# Patient Record
Sex: FEMALE | Race: WHITE | Employment: UNEMPLOYED | URBAN - METROPOLITAN AREA
[De-identification: names, ages, dates, MRNs, and addresses within clinical notes are randomized per-mention and may not be internally consistent; named-entity substitution may affect disease eponyms.]

---

## 2017-02-25 PROBLEM — D68.52 PROTHROMBIN GENE MUTATION (CODE): Status: ACTIVE | Noted: 2017-02-25

## 2017-12-21 ENCOUNTER — HOSPITAL ENCOUNTER (OUTPATIENT)
Dept: VASCULAR LAB | Age: 47
Discharge: HOME | End: 2017-12-21
Attending: INTERNAL MEDICINE | Admitting: INTERNAL MEDICINE

## 2017-12-21 DIAGNOSIS — I82.409 RECURRENT DEEP VEIN THROMBOSIS (HCC): Primary | ICD-10-CM

## 2017-12-21 DIAGNOSIS — Z86.711 PERSONAL HISTORY OF PULMONARY EMBOLISM: ICD-10-CM

## 2018-01-12 ENCOUNTER — OFFICE VISIT (OUTPATIENT)
Dept: INTERNAL MEDICINE CLINIC | Age: 48
End: 2018-01-12

## 2018-01-12 VITALS
OXYGEN SATURATION: 99 % | TEMPERATURE: 98 F | WEIGHT: 160 LBS | SYSTOLIC BLOOD PRESSURE: 110 MMHG | HEIGHT: 66 IN | HEART RATE: 89 BPM | DIASTOLIC BLOOD PRESSURE: 70 MMHG | BODY MASS INDEX: 25.71 KG/M2

## 2018-01-12 DIAGNOSIS — M79.605 PAIN IN BOTH LOWER EXTREMITIES: Primary | ICD-10-CM

## 2018-01-12 DIAGNOSIS — M79.604 PAIN IN BOTH LOWER EXTREMITIES: Primary | ICD-10-CM

## 2018-01-12 DIAGNOSIS — R13.10 DYSPHAGIA, UNSPECIFIED TYPE: ICD-10-CM

## 2018-01-12 DIAGNOSIS — Z86.718 HISTORY OF DVT (DEEP VEIN THROMBOSIS): ICD-10-CM

## 2018-01-12 PROCEDURE — 99213 OFFICE O/P EST LOW 20 MIN: CPT | Performed by: NURSE PRACTITIONER

## 2018-01-12 ASSESSMENT — PATIENT HEALTH QUESTIONNAIRE - PHQ9
SUM OF ALL RESPONSES TO PHQ9 QUESTIONS 1 & 2: 0
1. LITTLE INTEREST OR PLEASURE IN DOING THINGS: 0
SUM OF ALL RESPONSES TO PHQ QUESTIONS 1-9: 0
2. FEELING DOWN, DEPRESSED OR HOPELESS: 0

## 2018-01-12 ASSESSMENT — ENCOUNTER SYMPTOMS: TROUBLE SWALLOWING: 1

## 2018-02-02 ENCOUNTER — OFFICE VISIT (OUTPATIENT)
Dept: VASCULAR SURGERY | Age: 48
End: 2018-02-02

## 2018-02-02 VITALS
BODY MASS INDEX: 25.88 KG/M2 | HEIGHT: 66 IN | SYSTOLIC BLOOD PRESSURE: 118 MMHG | WEIGHT: 161 LBS | DIASTOLIC BLOOD PRESSURE: 68 MMHG

## 2018-02-02 DIAGNOSIS — Z86.718 HISTORY OF DVT (DEEP VEIN THROMBOSIS): Primary | ICD-10-CM

## 2018-02-02 PROCEDURE — 99203 OFFICE O/P NEW LOW 30 MIN: CPT | Performed by: SURGERY

## 2018-02-02 NOTE — PROGRESS NOTES
[facility])  Consultation/History & Physical    Date of Admission:  (Not on file)  Date of Consultation:  2/2/2018    PCP:  Josette Mcgrath NP     Chief Complaint:    Chief Complaint   Patient presents with    New Patient           History of Present Illness: Phu Watkins is a 41 y.o. female who presents with History of prothrombin gene mutation pulmonary embolism and lower extremity DVT. She was on anticoagulation, which was stopped secondary to heavy menstrual periods. She occasionally complains of discomfort in her right leg. She does have a knee-high support compression stockings. She is unclear what her family history is related to her clotting disorders. Currently, she is doing very well. Denies any aching, heaviness, or fatigue Presently. Denies any chest pain or shortness of breath. PMH:   has a past medical history of DVT (deep venous thrombosis) ([facility] [address] Endometriosis; Low back pain; PE (pulmonary embolism); and Prothrombin gene mutation ([facility] [address] PSH:   has a past surgical history that includes ovarian cyst removal (2005). Allergies:  No Known Allergies     Home Meds:    Prior to Admission medications    Medication Sig Start Date End Date Taking? Authorizing Provider   aspirin 81 MG tablet Take 81 mg by mouth daily   Yes Historical Provider, MD   Ascorbic Acid (VITAMIN C) 500 MG tablet Take 500 mg by mouth daily   Yes Historical Provider, MD        Hospital Meds:    Current Outpatient Prescriptions   Medication Sig Dispense Refill    aspirin 81 MG tablet Take 81 mg by mouth daily      Ascorbic Acid (VITAMIN C) 500 MG tablet Take 500 mg by mouth daily       No current facility-administered medications for this visit.         Social History:       Social History     Social History    Marital status:      Spouse name: N/A    Number of children: N/A    Years of education: N/A     Social History Main Topics    Smoking status: Never Smoker    Smokeless tobacco: Never Used    Alcohol  02/06/2013    K 4.5 02/06/2013     02/06/2013    CO2 25 09/07/2015    BUN 14 02/06/2013    CREATININE 0.6 09/07/2015    CREATININE 0.8 02/06/2013      No components found for: GLUNo results found for: MG   CBC:   Lab Results   Component Value Date    WBC 8.8 09/07/2015    HGB 13.3 09/07/2015    HCT 39.4 09/07/2015    MCV 95.8 09/07/2015     09/07/2015      Coagulation:   Lab Results   Component Value Date    INR 0.99 09/07/2015    APTT 28.6 09/07/2015     Cardiac markers:   Lab Results   Component Value Date    TROPONINI 0.00 09/07/2015     No results found for: LABA1C No results found for: ALB    Lab Results   Component Value Date    BILITOT 0.40 02/06/2013    AST 18 02/06/2013    ALT 14 02/06/2013    ALKPHOS 53 02/06/2013      Lab Results   Component Value Date    CHOL 190 02/06/2013    HDL 85 02/06/2013    LDLCALC 88 02/06/2013    TRIG 87 02/06/2013          Diagnosis:  Patient Active Problem List   Diagnosis    Back pain    Endometriosis    PE (pulmonary thromboembolism) ([address]    DVT (deep venous thrombosis) (HCC)    Acute pulmonary embolism (HCC)    Prothrombin gene mutation ([address]    Pulmonary embolus ([address]    Acute embolism and thrombosis of vein    Recurrent deep vein thrombosis (HCC)    Prothrombin gene mutation (CODE)    Dysphagia    History of DVT (deep vein thrombosis)    Pain in both lower extremities         Assessment:     No diagnosis found. Recommendations/Plan:  30-year-old female with prothrombin gene mutation and isolated DVT of the right leg in 2015. She does have residual chronic clot which is expected and I explained this to her in detail today. I would recommend thigh-high 20-30 mm support stockings. She will wear this for several weeks and follow-up with me. I had a long discussion with her, related to her anticoagulation. Given her heavy menses. I agree with aspirin only.   I did instruct her to take a dose of Xarelto  if she were to take long

## 2019-02-13 ENCOUNTER — TELEPHONE (OUTPATIENT)
Dept: INTERNAL MEDICINE CLINIC | Age: 49
End: 2019-02-13

## 2019-07-14 ENCOUNTER — HOSPITAL ENCOUNTER (EMERGENCY)
Age: 49
Discharge: HOME | End: 2019-07-14
Attending: EMERGENCY MEDICINE
Payer: COMMERCIAL

## 2019-07-14 ENCOUNTER — APPOINTMENT (OUTPATIENT)
Dept: CT IMAGING | Age: 49
End: 2019-07-14
Payer: COMMERCIAL

## 2019-07-14 VITALS
SYSTOLIC BLOOD PRESSURE: 118 MMHG | RESPIRATION RATE: 14 BRPM | WEIGHT: 161 LBS | HEART RATE: 83 BPM | BODY MASS INDEX: 25.88 KG/M2 | HEIGHT: 66 IN | DIASTOLIC BLOOD PRESSURE: 77 MMHG | OXYGEN SATURATION: 99 % | TEMPERATURE: 98.5 F

## 2019-07-14 DIAGNOSIS — M79.661 RIGHT CALF PAIN: ICD-10-CM

## 2019-07-14 DIAGNOSIS — R07.9 CHEST PAIN, UNSPECIFIED TYPE: Primary | ICD-10-CM

## 2019-07-14 LAB
ANION GAP SERPL CALCULATED.3IONS-SCNC: 12 MMOL/L (ref 3–16)
BASOPHILS ABSOLUTE: 0 K/UL (ref 0–0.2)
BASOPHILS RELATIVE PERCENT: 0.5 %
BUN BLDV-MCNC: 12 MG/DL (ref 7–20)
CALCIUM SERPL-MCNC: 9.3 MG/DL (ref 8.3–10.6)
CHLORIDE BLD-SCNC: 104 MMOL/L (ref 99–110)
CO2: 26 MMOL/L (ref 21–32)
CREAT SERPL-MCNC: 0.6 MG/DL (ref 0.6–1.1)
EOSINOPHILS ABSOLUTE: 0 K/UL (ref 0–0.6)
EOSINOPHILS RELATIVE PERCENT: 0.8 %
GFR AFRICAN AMERICAN: >60
GFR NON-AFRICAN AMERICAN: >60
GLUCOSE BLD-MCNC: 136 MG/DL (ref 70–99)
HCT VFR BLD CALC: 39 % (ref 36–48)
HEMOGLOBIN: 13.3 G/DL (ref 12–16)
INR BLD: 1.08 (ref 0.86–1.14)
LYMPHOCYTES ABSOLUTE: 1.1 K/UL (ref 1–5.1)
LYMPHOCYTES RELATIVE PERCENT: 20.7 %
MCH RBC QN AUTO: 32.7 PG (ref 26–34)
MCHC RBC AUTO-ENTMCNC: 34.2 G/DL (ref 31–36)
MCV RBC AUTO: 95.7 FL (ref 80–100)
MONOCYTES ABSOLUTE: 0.4 K/UL (ref 0–1.3)
MONOCYTES RELATIVE PERCENT: 7.6 %
NEUTROPHILS ABSOLUTE: 3.6 K/UL (ref 1.7–7.7)
NEUTROPHILS RELATIVE PERCENT: 70.4 %
PDW BLD-RTO: 13.2 % (ref 12.4–15.4)
PLATELET # BLD: 213 K/UL (ref 135–450)
PMV BLD AUTO: 8.2 FL (ref 5–10.5)
POTASSIUM SERPL-SCNC: 4.6 MMOL/L (ref 3.5–5.1)
PROTHROMBIN TIME: 12.3 SEC (ref 9.8–13)
RBC # BLD: 4.07 M/UL (ref 4–5.2)
SODIUM BLD-SCNC: 142 MMOL/L (ref 136–145)
TROPONIN: <0.01 NG/ML
TROPONIN: <0.01 NG/ML
WBC # BLD: 5.1 K/UL (ref 4–11)

## 2019-07-14 PROCEDURE — 96372 THER/PROPH/DIAG INJ SC/IM: CPT

## 2019-07-14 PROCEDURE — 99285 EMERGENCY DEPT VISIT HI MDM: CPT

## 2019-07-14 PROCEDURE — 6360000002 HC RX W HCPCS: Performed by: NURSE PRACTITIONER

## 2019-07-14 PROCEDURE — 93005 ELECTROCARDIOGRAM TRACING: CPT | Performed by: EMERGENCY MEDICINE

## 2019-07-14 PROCEDURE — 80048 BASIC METABOLIC PNL TOTAL CA: CPT

## 2019-07-14 PROCEDURE — 85025 COMPLETE CBC W/AUTO DIFF WBC: CPT

## 2019-07-14 PROCEDURE — 85610 PROTHROMBIN TIME: CPT

## 2019-07-14 PROCEDURE — 71260 CT THORAX DX C+: CPT

## 2019-07-14 PROCEDURE — 84484 ASSAY OF TROPONIN QUANT: CPT

## 2019-07-14 PROCEDURE — 6360000004 HC RX CONTRAST MEDICATION: Performed by: EMERGENCY MEDICINE

## 2019-07-14 RX ORDER — ESOMEPRAZOLE MAGNESIUM 40 MG/1
20 FOR SUSPENSION ORAL DAILY
COMMUNITY

## 2019-07-14 RX ORDER — OMEGA-3S/DHA/EPA/FISH OIL 1000-1400
CAPSULE,DELAYED RELEASE (ENTERIC COATED) ORAL
COMMUNITY

## 2019-07-14 RX ORDER — BIOTIN 10000 MCG
CAPSULE ORAL
COMMUNITY

## 2019-07-14 RX ADMIN — ENOXAPARIN SODIUM 100 MG: 100 INJECTION SUBCUTANEOUS at 16:30

## 2019-07-14 RX ADMIN — IOPAMIDOL 80 ML: 755 INJECTION, SOLUTION INTRAVENOUS at 14:26

## 2019-07-14 ASSESSMENT — PAIN DESCRIPTION - PAIN TYPE: TYPE: ACUTE PAIN

## 2019-07-14 ASSESSMENT — PAIN DESCRIPTION - ORIENTATION: ORIENTATION: RIGHT

## 2019-07-14 ASSESSMENT — PAIN SCALES - GENERAL: PAINLEVEL_OUTOF10: 4

## 2019-07-14 ASSESSMENT — PAIN DESCRIPTION - LOCATION: LOCATION: KNEE

## 2019-07-14 NOTE — ED PROVIDER NOTES
ED Attending Attestation Note     Date of evaluation: 7/14/2019    This patient was seen by the advance practice provider. I have seen and examined the patient, agree with the workup, evaluation, management and diagnosis. The care plan has been discussed. I have reviewed the ECG and concur with the PATTI's interpretation. My assessment reveals 53-year-old female with a history of prothrombin gene mutation with a history of DVTs and PEs. Patient comes in for leg pain on the left side as well as some chest tightness. Patient has been off of her anticoagulation has been just been taking a daily aspirin. Patient did have recent travel. Patient also complained of some chest tightness that comes and goes and is not exertional.  EKG is nonischemic. Left lower extremity without any significant warmth or erythema but mild tenderness to palpation to the distal aspect of the posterior thigh. No palpable cord normal sensation normal pulses. Lung and heart sounds are clear. CTA of the chest obtained negative for PE. Labs sent including delta troponin that are unremarkable. Given unable to obtain duplex as it is a Sunday, patient given 1.5 mg/kg of Lovenox and given instructions on obtaining ultrasound tomorrow.     Sumit Sylvester MD   [facility]       Leia Alcaraz MD  07/14/19 [id number]
She is on Xarelto for about a year and a half and had transitioned to a baby aspirin. States she drove to [place] last week the back home. Patient is now concerned she may have another DVT or PE. She did take a Xarelto yesterday for her symptoms. On exam alert and oriented ×3. Appears in no acute distress. Lungs clear to auscultation. Heart regular rate and rhythm. Right calf is slightly swollen, states chronic for her. Negative Homans sign. Mild calf tenderness. Peripheral pulses intact. Skin warm and dry. EKG revealed normal sinus rhythm with no acute findings. CTPA negative for PE.    CBC, BMP, troponin ×2 negative. Patient was given Lovenox 1.5 mg/kg while in the ED. She was written an outpatient prescription for a venous Doppler study to be performed tomorrow. She is instructed to get the ultrasound and felt to primary doctor as well. Patient was given strict return precautions. Since the patient has had a previous PE and DVT in the past, CTPA was ordered to rule out PE. This patient was alsoevaluated by the attending physician. Complete historyand physical was performed by me and Pbalito Bennett MD  . Nursing notes, past medical history, surgical history, family history and social history were reviewed and addressed in the HPI. All care plans were discussed and agreed upon. Clinical Impression     1. Chest pain, unspecified type    2.  Right calf pain        Disposition     PATIENT REFERRED TO:   [address]  [address] [facility] [id number]    In 1 day        DISCHARGE MEDICATIONS:  New Prescriptions    No medications on file       DISPOSITION Decision To Discharge       ALDEN Melton - CNP  07/14/19 [id number]

## 2019-07-14 NOTE — ED NOTES
Pt resting in bed, 2/2 side rails up, fall precautions in place per Connolly fall scale. Pt appears comfortable, no distress noted at this time. IV intact, no complications noted. Dressing clean, dry and intact. Denies any needs at this time. Call light in reach.        Amaris Kennedy RN  07/14/19 [id number]

## 2019-07-14 NOTE — ED TRIAGE NOTES
Pt presents to ED with right knee pain and mild chest pain x 1-2 days. Flew to [place] 1 week ago and then drove home. EKG complete. VSS.

## 2019-07-15 ENCOUNTER — HOSPITAL ENCOUNTER (OUTPATIENT)
Dept: VASCULAR LAB | Age: 49
Discharge: HOME | End: 2019-07-15
Payer: COMMERCIAL

## 2019-07-15 DIAGNOSIS — M79.661 RIGHT CALF PAIN: ICD-10-CM

## 2019-07-15 LAB
EKG ATRIAL RATE: 90 BPM
EKG DIAGNOSIS: NORMAL
EKG P AXIS: 74 DEGREES
EKG P-R INTERVAL: 156 MS
EKG Q-T INTERVAL: 362 MS
EKG QRS DURATION: 74 MS
EKG QTC CALCULATION (BAZETT): 442 MS
EKG R AXIS: 31 DEGREES
EKG T AXIS: 32 DEGREES
EKG VENTRICULAR RATE: 90 BPM

## 2019-07-15 PROCEDURE — 93971 EXTREMITY STUDY: CPT

## 2019-08-06 ENCOUNTER — OFFICE VISIT (OUTPATIENT)
Dept: VASCULAR SURGERY | Age: 49
End: 2019-08-06
Payer: COMMERCIAL

## 2019-08-06 VITALS
SYSTOLIC BLOOD PRESSURE: 118 MMHG | BODY MASS INDEX: 26.03 KG/M2 | DIASTOLIC BLOOD PRESSURE: 64 MMHG | WEIGHT: 162 LBS | HEIGHT: 66 IN

## 2019-08-06 DIAGNOSIS — Z86.718 HISTORY OF DVT (DEEP VEIN THROMBOSIS): Primary | ICD-10-CM

## 2019-08-06 PROCEDURE — 1036F TOBACCO NON-USER: CPT | Performed by: SURGERY

## 2019-08-06 PROCEDURE — G8427 DOCREV CUR MEDS BY ELIG CLIN: HCPCS | Performed by: SURGERY

## 2019-08-06 PROCEDURE — 99212 OFFICE O/P EST SF 10 MIN: CPT | Performed by: SURGERY

## 2019-08-06 PROCEDURE — G8419 CALC BMI OUT NRM PARAM NOF/U: HCPCS | Performed by: SURGERY

## 2019-08-06 RX ORDER — RANITIDINE 150 MG/1
150 TABLET ORAL 2 TIMES DAILY
COMMUNITY

## 2021-02-04 ENCOUNTER — HOSPITAL ENCOUNTER (EMERGENCY)
Age: 51
Discharge: HOME | End: 2021-02-04
Attending: EMERGENCY MEDICINE
Payer: COMMERCIAL

## 2021-02-04 ENCOUNTER — APPOINTMENT (OUTPATIENT)
Dept: CT IMAGING | Age: 51
End: 2021-02-04
Payer: COMMERCIAL

## 2021-02-04 VITALS
SYSTOLIC BLOOD PRESSURE: 122 MMHG | TEMPERATURE: 98.5 F | HEIGHT: 66 IN | DIASTOLIC BLOOD PRESSURE: 70 MMHG | OXYGEN SATURATION: 99 % | WEIGHT: 165 LBS | HEART RATE: 88 BPM | BODY MASS INDEX: 26.52 KG/M2 | RESPIRATION RATE: 16 BRPM

## 2021-02-04 DIAGNOSIS — I82.461 ACUTE DEEP VEIN THROMBOSIS (DVT) OF CALF MUSCLE VEIN OF RIGHT LOWER EXTREMITY (HCC): Primary | ICD-10-CM

## 2021-02-04 LAB
ANION GAP SERPL CALCULATED.3IONS-SCNC: 8 MMOL/L (ref 3–16)
BASOPHILS ABSOLUTE: 0 K/UL (ref 0–0.2)
BASOPHILS RELATIVE PERCENT: 0.2 %
BUN BLDV-MCNC: 10 MG/DL (ref 7–20)
CALCIUM SERPL-MCNC: 9.5 MG/DL (ref 8.3–10.6)
CHLORIDE BLD-SCNC: 105 MMOL/L (ref 99–110)
CO2: 26 MMOL/L (ref 21–32)
CREAT SERPL-MCNC: 0.6 MG/DL (ref 0.6–1.1)
D DIMER: 1018 NG/ML DDU (ref 0–229)
EKG ATRIAL RATE: 82 BPM
EKG DIAGNOSIS: NORMAL
EKG P AXIS: 77 DEGREES
EKG P-R INTERVAL: 150 MS
EKG Q-T INTERVAL: 360 MS
EKG QRS DURATION: 76 MS
EKG QTC CALCULATION (BAZETT): 420 MS
EKG R AXIS: 68 DEGREES
EKG T AXIS: 59 DEGREES
EKG VENTRICULAR RATE: 82 BPM
EOSINOPHILS ABSOLUTE: 0 K/UL (ref 0–0.6)
EOSINOPHILS RELATIVE PERCENT: 0.3 %
GFR AFRICAN AMERICAN: >60
GFR NON-AFRICAN AMERICAN: >60
GLUCOSE BLD-MCNC: 90 MG/DL (ref 70–99)
HCG(URINE) PREGNANCY TEST: NEGATIVE
HCT VFR BLD CALC: 41.9 % (ref 36–48)
HEMOGLOBIN: 13.9 G/DL (ref 12–16)
INR BLD: 0.97 (ref 0.86–1.14)
LYMPHOCYTES ABSOLUTE: 0.9 K/UL (ref 1–5.1)
LYMPHOCYTES RELATIVE PERCENT: 13.4 %
MCH RBC QN AUTO: 32.9 PG (ref 26–34)
MCHC RBC AUTO-ENTMCNC: 33.2 G/DL (ref 31–36)
MCV RBC AUTO: 99.1 FL (ref 80–100)
MONOCYTES ABSOLUTE: 0.6 K/UL (ref 0–1.3)
MONOCYTES RELATIVE PERCENT: 8.1 %
NEUTROPHILS ABSOLUTE: 5.4 K/UL (ref 1.7–7.7)
NEUTROPHILS RELATIVE PERCENT: 78 %
PDW BLD-RTO: 13.2 % (ref 12.4–15.4)
PLATELET # BLD: 196 K/UL (ref 135–450)
PMV BLD AUTO: 8 FL (ref 5–10.5)
POTASSIUM REFLEX MAGNESIUM: 3.7 MMOL/L (ref 3.5–5.1)
PROTHROMBIN TIME: 11.2 SEC (ref 10–13.2)
RBC # BLD: 4.22 M/UL (ref 4–5.2)
SODIUM BLD-SCNC: 139 MMOL/L (ref 136–145)
TROPONIN: <0.01 NG/ML
WBC # BLD: 6.9 K/UL (ref 4–11)

## 2021-02-04 PROCEDURE — 93005 ELECTROCARDIOGRAM TRACING: CPT | Performed by: STUDENT IN AN ORGANIZED HEALTH CARE EDUCATION/TRAINING PROGRAM

## 2021-02-04 PROCEDURE — 6360000004 HC RX CONTRAST MEDICATION: Performed by: STUDENT IN AN ORGANIZED HEALTH CARE EDUCATION/TRAINING PROGRAM

## 2021-02-04 PROCEDURE — 84703 CHORIONIC GONADOTROPIN ASSAY: CPT

## 2021-02-04 PROCEDURE — 93971 EXTREMITY STUDY: CPT

## 2021-02-04 PROCEDURE — 85610 PROTHROMBIN TIME: CPT

## 2021-02-04 PROCEDURE — 99284 EMERGENCY DEPT VISIT MOD MDM: CPT

## 2021-02-04 PROCEDURE — 36415 COLL VENOUS BLD VENIPUNCTURE: CPT

## 2021-02-04 PROCEDURE — 84484 ASSAY OF TROPONIN QUANT: CPT

## 2021-02-04 PROCEDURE — 85379 FIBRIN DEGRADATION QUANT: CPT

## 2021-02-04 PROCEDURE — 85025 COMPLETE CBC W/AUTO DIFF WBC: CPT

## 2021-02-04 PROCEDURE — 71260 CT THORAX DX C+: CPT

## 2021-02-04 PROCEDURE — 6370000000 HC RX 637 (ALT 250 FOR IP): Performed by: STUDENT IN AN ORGANIZED HEALTH CARE EDUCATION/TRAINING PROGRAM

## 2021-02-04 PROCEDURE — 80048 BASIC METABOLIC PNL TOTAL CA: CPT

## 2021-02-04 RX ADMIN — IOPAMIDOL 80 ML: 755 INJECTION, SOLUTION INTRAVENOUS at 11:31

## 2021-02-04 RX ADMIN — RIVAROXABAN 15 MG: 15 TABLET, FILM COATED ORAL at 16:09

## 2021-02-04 ASSESSMENT — PAIN DESCRIPTION - ORIENTATION: ORIENTATION: RIGHT;LOWER;POSTERIOR

## 2021-02-04 ASSESSMENT — PAIN DESCRIPTION - DESCRIPTORS: DESCRIPTORS: CRAMPING

## 2021-02-04 ASSESSMENT — PAIN SCALES - GENERAL: PAINLEVEL_OUTOF10: 5

## 2021-02-04 NOTE — ED PROVIDER NOTES
[address] ENCOUNTER          EM RESIDENT NOTE       Date of evaluation: 2/4/2021    Chief Complaint     Leg Pain (hx DVT and PE, drove to [place] last weekend and since increasing pain to back of right calf) and Chest Pain (slight chest pressure, intermittent for the last couple days)      History of Present Illness     Danette Austin is a 52 y.o. female past medical history significant for prothrombin gene mutation, prior DVT and PE who presents with right calf pain. The patient states that this started on Sunday evening when she was trying to drive back from [place] and got stuck in the snow and had to stay at a hotel. The patient states that she is also had a feeling of chest tightness substernally that may have radiated to her left arm for short period of time last night. She describes this as only a mild pain and is uncertain if it is associated with her anxiety. She has mostly come the she is concerned about her right leg as this is a similar presentation to her prior DVTs. She states that she forgot her compression stockings when she went on a trip so she had not had it for a few days. She is wearing it today and states that it does help the pain some. She endorses swelling in her right lower extremity but states that this is baseline. The patient used to be on Xarelto but was able to stop this recently with approval of her vascular surgeon and cardiologist.  She otherwise denies any fevers, chills, cough, shortness of breath, abdominal pain, nausea, vomiting, diarrhea, headaches, syncope, changes in her vision. Review of Systems     Full review of systems was done and is negative other than as noted in HPI    Past Medical, Surgical, Family, and Social History     She has a past medical history of DVT (deep venous thrombosis) ([facility] [address] Endometriosis, Low back pain, PE (pulmonary embolism), and Prothrombin gene mutation ([facility] [address] She has a past surgical history that includes ovarian cyst removal (2005). Her family history includes Alzheimer's Disease in her maternal grandfather; Cancer in her father, maternal aunt, maternal grandmother, and mother; Diabetes in her mother; Mult Sclerosis in her father; Obesity in her mother. She reports that she has never smoked. She has never used smokeless tobacco. She reports current alcohol use. She reports that she does not use drugs. Medications     Discharge Medication List as of 2/4/2021  3:51 PM      CONTINUE these medications which have NOT CHANGED    Details   esomeprazole Magnesium (NEXIUM) 40 MG PACK Take 20 mg by mouth dailyHistorical Med      aspirin 81 MG tablet Take 81 mg by mouth dailyHistorical Med      ranitidine (ZANTAC) 150 MG tablet Take 150 mg by mouth 2 times dailyHistorical Med      Biotin 10 MG CAPS Take by mouthHistorical Med      FIBER ADULT GUMMIES 2 g CHEW Take by mouthHistorical Med      Ascorbic Acid (VITAMIN C) 500 MG tablet Take 500 mg by mouth daily             Allergies     She has No Known Allergies. Physical Exam     INITIAL VITALS: BP: (!) 152/89, Temp: 98.5 °F (36.9 °C), Pulse: 90, Resp: 19, SpO2: 100 %   Physical Exam  Vitals signs and nursing note reviewed. Constitutional:       General: She is not in acute distress. Appearance: She is not toxic-appearing. HENT:      Head: Normocephalic and atraumatic. Right Ear: External ear normal.      Left Ear: External ear normal.      Nose: Nose normal.      Mouth/Throat:      Mouth: Mucous membranes are moist.      Pharynx: Oropharynx is clear. Eyes:      General: No scleral icterus. Extraocular Movements: Extraocular movements intact. Conjunctiva/sclera: Conjunctivae normal.      Pupils: Pupils are equal, round, and reactive to light. Neck:      Musculoskeletal: Normal range of motion. Cardiovascular:      Rate and Rhythm: Regular rhythm. Tachycardia present. Pulses: Normal pulses. Heart sounds: Normal heart sounds. Comments: DP pulses intact & equal  Pulmonary:      Effort: Pulmonary effort is normal. No respiratory distress. Breath sounds: No wheezing. Abdominal:      General: There is no distension. Palpations: Abdomen is soft. Tenderness: There is no abdominal tenderness. Musculoskeletal:         General: Tenderness present. No deformity or signs of injury. Right lower leg: Edema present. Left lower leg: No edema. Comments: Compression stocking in place, trace RLE edema; R calf mildly TTP; negative homans sign   Lymphadenopathy:      Cervical: No cervical adenopathy. Skin:     General: Skin is warm and dry. Capillary Refill: Capillary refill takes less than 2 seconds. Findings: No rash. Neurological:      General: No focal deficit present. Mental Status: She is alert and oriented to person, place, and time. DiagnosticResults     EKG   Interpreted in conjunction with emergencydepartment physician Comfort Manzo, *  Rhythm: normal sinus   Rate: 82  Axis: normal  Ectopy: none  Conduction: normal  ST Segments: normal  T Waves:no acute change  Q Waves: none  Clinical Impression: no acute changes  Comparison:  7/14/19    RADIOLOGY:  VL Extremity Venous Right         CT CHEST PULMONARY EMBOLISM W CONTRAST   Final Result   1. No evidence of pulmonary embolus.       2. No interval changes             LABS:   Results for orders placed or performed during the hospital encounter of 02/04/21   CBC Auto Differential   Result Value Ref Range    WBC 6.9 4.0 - 11.0 K/uL    RBC 4.22 4.00 - 5.20 M/uL    Hemoglobin 13.9 12.0 - 16.0 g/dL    Hematocrit 41.9 36.0 - 48.0 %    MCV 99.1 80.0 - 100.0 fL    MCH 32.9 26.0 - 34.0 pg    MCHC 33.2 31.0 - 36.0 g/dL    RDW 13.2 12.4 - 15.4 %    Platelets 903 350 - 804 K/uL    MPV 8.0 5.0 - 10.5 fL    Neutrophils % 78.0 %    Lymphocytes % 13.4 %    Monocytes % 8.1 %    Eosinophils % 0.3 % Basophils % 0.2 %    Neutrophils Absolute 5.4 1.7 - 7.7 K/uL    Lymphocytes Absolute 0.9 (L) 1.0 - 5.1 K/uL    Monocytes Absolute 0.6 0.0 - 1.3 K/uL    Eosinophils Absolute 0.0 0.0 - 0.6 K/uL    Basophils Absolute 0.0 0.0 - 0.2 K/uL   Basic Metabolic Panel w/ Reflex to MG   Result Value Ref Range    Sodium 139 136 - 145 mmol/L    Potassium reflex Magnesium 3.7 3.5 - 5.1 mmol/L    Chloride 105 99 - 110 mmol/L    CO2 26 21 - 32 mmol/L    Anion Gap 8 3 - 16    Glucose 90 70 - 99 mg/dL    BUN 10 7 - 20 mg/dL    CREATININE 0.6 0.6 - 1.1 mg/dL    GFR Non-African American >60 >60    GFR African American >60 >60    Calcium 9.5 8.3 - 10.6 mg/dL   D-Dimer, Quantitative   Result Value Ref Range    D-Dimer, Quant 1018 (H) 0 - 229 ng/mL DDU   Troponin   Result Value Ref Range    Troponin <0.01 <0.01 ng/mL   Protime-INR   Result Value Ref Range    Protime 11.2 10.0 - 13.2 sec    INR 0.97 0.86 - 1.14   Pregnancy, urine   Result Value Ref Range    HCG(Urine) Pregnancy Test Negative Detects HCG level >20 MIU/mL   EKG 12 Lead   Result Value Ref Range    Ventricular Rate 82 BPM    Atrial Rate 82 BPM    P-R Interval 150 ms    QRS Duration 76 ms    Q-T Interval 360 ms    QTc Calculation (Bazett) 420 ms    P Axis 77 degrees    R Axis 68 degrees    T Axis 59 degrees    Diagnosis       EKG performed in ER and to be interpreted by ER physician. Confirmed by MD, ER ([id number]),  ALEXEY Carreon (HOWARD) ([id number]) on 2/4/2021 10:47:03 AM       RECENT VITALS:  BP: 122/70, Temp: 98.5 °F (36.9 °C), Pulse: 88,Resp: 16, SpO2: 99 %       ED Course     Nursing Notes, Past Medical Hx, Past Surgical Hx, Social Hx, Allergies, and Family Hx were reviewed.     The patient was given the followingmedications:  Orders Placed This Encounter   Medications    iopamidol (ISOVUE-370) 76 % injection 80 mL    rivaroxaban (XARELTO) tablet 15 mg    DISCONTD: rivaroxaban (XARELTO) 15 MG TABS tablet     Sig: Take 1 tablet by mouth 2 times daily (with meals) for 21 days Dispense:  42 tablet     Refill:  0    rivaroxaban (XARELTO) 15 MG TABS tablet     Sig: Take 1 tablet by mouth 2 times daily (with meals) for 21 days     Dispense:  42 tablet     Refill:  0       CONSULTS:  IP CONSULT TO [address] [place] [address] / VICKY / Vane Siemens is a 52 y.o. female presenting with leg pain and swelling and chest tightness concerning for acute DVT. EKG nonischemic and negative troponin with more than 24 hours of symptoms less concerning for ACS. Given patient history, not being on anticoagulation, elevated D-dimer, she received a CT pulmonary angiogram which was ultimately negative for acute PE. Lower extremity Doppler positive for new occluding DVT in 2 sets of right soleus vein. Patient was restarted on Xarelto and consult with hematology. She was given her first dose of Xarelto here in the emergency department and a prescription for starting dose with instruction to follow-up with her hematologist in the next week. I also spoke with the patient's primary doctor to inform her of the developments as patient was referred here by her office. This patient was also evaluated by the attending physician. All care plans were discussed and agreed upon. Clinical Impression     1.  Acute deep vein thrombosis (DVT) of calf muscle vein of right lower extremity [facility])        Disposition     PATIENT REFERRED TO:  Ninoska Vasquez MD  [address] [place] [address] [address] [facility] [address] [facility]  [phone]    Schedule an appointment as soon as possible for a visit in 1 week        DISCHARGE MEDICATIONS:  Discharge Medication List as of 2/4/2021  3:51 PM          DISPOSITION Decision To Discharge 02/04/2021 04:09:05 PM At this time, the patient was deemed appropriate for discharge. Discharge instructions, including strict return precautions for new or worsening symptoms concerning to the patient, were provided. All of her questions were answered satisfactorily, and she was subsequently sent home in stable condition. The patient is instructed to return to the emergency department should her symptoms worsen or any concern she believes warrants acute physician evaluation.        Davey Vora MD  Resident  02/04/21 4650

## 2021-02-04 NOTE — ED PROVIDER NOTES
ED Attending Attestation Note     Date of evaluation: 2/4/2021    This patient was seen by the resident. I have seen and examined the patient, agree with the workup, evaluation, management and diagnosis. The care plan has been discussed. I have reviewed the ECG and concur with the resident's interpretation. My assessment reveals adult female complaining of some mild pain in her distal right calf. She reports is some baseline edema there, not significantly changed. No skin changes, no discoloration. She reports she had a little bit of feeling of chest tightness in the last day or so as well. Labs were markedly elevated D-dimer, ultrasound of the calf shows occluding soleus DVT. No PE noted on chest CT.   We will restart patient Xarelto in consultation with her hematologist.       Rafaela Taylor MD  02/04/21 [id number]

## 2021-03-16 ENCOUNTER — OFFICE VISIT (OUTPATIENT)
Dept: VASCULAR SURGERY | Age: 51
End: 2021-03-16
Payer: COMMERCIAL

## 2021-03-16 VITALS
TEMPERATURE: 98.1 F | BODY MASS INDEX: 26.84 KG/M2 | DIASTOLIC BLOOD PRESSURE: 78 MMHG | SYSTOLIC BLOOD PRESSURE: 124 MMHG | HEIGHT: 66 IN | WEIGHT: 167 LBS

## 2021-03-16 DIAGNOSIS — I82.461 ACUTE DEEP VEIN THROMBOSIS (DVT) OF CALF MUSCLE VEIN OF RIGHT LOWER EXTREMITY (HCC): Primary | ICD-10-CM

## 2021-03-16 PROCEDURE — 99213 OFFICE O/P EST LOW 20 MIN: CPT | Performed by: SURGERY

## 2021-03-16 PROCEDURE — G8419 CALC BMI OUT NRM PARAM NOF/U: HCPCS | Performed by: SURGERY

## 2021-03-16 PROCEDURE — G8427 DOCREV CUR MEDS BY ELIG CLIN: HCPCS | Performed by: SURGERY

## 2021-03-16 PROCEDURE — 1036F TOBACCO NON-USER: CPT | Performed by: SURGERY

## 2021-03-16 PROCEDURE — G8484 FLU IMMUNIZE NO ADMIN: HCPCS | Performed by: SURGERY

## 2021-03-16 NOTE — PROGRESS NOTES
[facility])   Vascular Surgery Followup    Referring Provider:  Марина Thompson     Chief Complaint   Patient presents with    Follow-up        History of Present Illness:  82-year-old female here today for follow-up with known history of prothrombin gene mutation and previous PE and DVT in 2015. She presents today in follow-up with new isolated soleal vein DVT of the right lower extremity. She is now on a quite relation. Does have some mild discomfort in her right calf, but is much better. No other complaints    Past Medical History:   has a past medical history of DVT (deep venous thrombosis) ([facility] [address] Endometriosis, Low back pain, PE (pulmonary embolism), and Prothrombin gene mutation ([facility] [address] Surgical History:   has a past surgical history that includes ovarian cyst removal (2005). Social History:   reports that she has never smoked. She has never used smokeless tobacco. She reports current alcohol use. She reports that she does not use drugs. Family History:  family history includes Alzheimer's Disease in her maternal grandfather; Cancer in her father, maternal aunt, maternal grandmother, and mother; Diabetes in her mother; Mult Sclerosis in her father; Obesity in her mother. Home Medications:  Current Outpatient Medications   Medication Sig Dispense Refill    rivaroxaban (XARELTO) 15 MG TABS tablet Take 1 tablet by mouth 2 times daily (with meals) for 21 days 42 tablet 0    ranitidine (ZANTAC) 150 MG tablet Take 150 mg by mouth 2 times daily      Biotin 10 MG CAPS Take by mouth      esomeprazole Magnesium (NEXIUM) 40 MG PACK Take 20 mg by mouth daily      FIBER ADULT GUMMIES 2 g CHEW Take by mouth      aspirin 81 MG tablet Take 81 mg by mouth daily      Ascorbic Acid (VITAMIN C) 500 MG tablet Take 500 mg by mouth daily       No current facility-administered medications for this visit. Allergies:  Patient has no known allergies.      Review of Systems:   · Constitutional: there has been no unanticipated weight loss. There's been no change in energy level, sleep pattern, or activity level. · Eyes: No visual changes or diplopia. No scleral icterus. · ENT: No Headaches, hearing loss or vertigo. No mouth sores or sore throat. · Cardiovascular: Reviewed in HPI  · Respiratory: No cough or wheezing, no sputum production. No hematemesis. · Gastrointestinal: No abdominal pain, appetite loss, blood in stools. No change in bowel or bladder habits. · Genitourinary: No dysuria, trouble voiding, or hematuria. · Musculoskeletal:  No gait disturbance, weakness or joint complaints. · Integumentary: No rash or pruritis. · Neurological: No headache, diplopia, change in muscle strength, numbness or tingling. No change in gait, balance, coordination, mood, affect, memory, mentation, behavior. · Psychiatric: No anxiety, no depression. · Endocrine: No malaise, fatigue or temperature intolerance. No excessive thirst, fluid intake, or urination. No tremor. · Hematologic/Lymphatic: No abnormal bruising or bleeding, blood clots or swollen lymph nodes. · Allergic/Immunologic: No nasal congestion or hives. Physical Examination:    Vitals:    03/16/21 0949   BP: 124/78   Temp: 98.1 °F (36.7 °C)          General appearance: alert, appears stated age, cooperative and no distress  No significant edema. Palpable distal pulses. No distal cyanosis or ischemia. MEDICAL DECISION MAKING/TESTING  I have reviewed the testing personally and my interpretation is below. Right Impression   There is acute totally occluding deep venous thrombosis involving two sets of   soleal veins in the mid calf. There is no other evidence of deep or superficial venous thrombosis involving   the right lower extremity. Left Impression   There is no evidence of deep venous thrombosis involving the common femoral   vein.          Assessment:     Patient Active Problem List   Diagnosis    Back pain   